# Patient Record
Sex: MALE | Race: WHITE | NOT HISPANIC OR LATINO | Employment: OTHER | URBAN - METROPOLITAN AREA
[De-identification: names, ages, dates, MRNs, and addresses within clinical notes are randomized per-mention and may not be internally consistent; named-entity substitution may affect disease eponyms.]

---

## 2017-03-13 ENCOUNTER — HISTORICAL (OUTPATIENT)
Dept: ADMINISTRATIVE | Facility: HOSPITAL | Age: 67
End: 2017-03-13

## 2019-03-18 ENCOUNTER — HISTORICAL (OUTPATIENT)
Dept: ADMINISTRATIVE | Facility: HOSPITAL | Age: 69
End: 2019-03-18

## 2019-03-18 LAB — GROUP & RH: NORMAL

## 2022-04-11 ENCOUNTER — HISTORICAL (OUTPATIENT)
Dept: ADMINISTRATIVE | Facility: HOSPITAL | Age: 72
End: 2022-04-11

## 2022-04-27 VITALS
SYSTOLIC BLOOD PRESSURE: 138 MMHG | WEIGHT: 286.19 LBS | BODY MASS INDEX: 38.76 KG/M2 | DIASTOLIC BLOOD PRESSURE: 62 MMHG | HEIGHT: 72 IN

## 2022-05-24 ENCOUNTER — LAB REQUISITION (OUTPATIENT)
Dept: LAB | Facility: HOSPITAL | Age: 72
End: 2022-05-24
Payer: MEDICARE

## 2022-05-24 DIAGNOSIS — L08.9 LOCAL INFECTION OF THE SKIN AND SUBCUTANEOUS TISSUE, UNSPECIFIED: ICD-10-CM

## 2022-05-24 PROCEDURE — 87075 CULTR BACTERIA EXCEPT BLOOD: CPT

## 2022-05-24 PROCEDURE — 87070 CULTURE OTHR SPECIMN AEROBIC: CPT

## 2022-05-28 LAB — BACTERIA SPEC CULT: NO GROWTH

## 2022-05-29 LAB
BACTERIA SPEC ANAEROBE CULT: ABNORMAL
BACTERIA SPEC ANAEROBE CULT: ABNORMAL

## 2022-06-10 ENCOUNTER — HOSPITAL ENCOUNTER (OUTPATIENT)
Facility: HOSPITAL | Age: 72
Discharge: HOME | End: 2022-06-10
Attending: PODIATRIST | Admitting: PODIATRIST
Payer: MEDICARE

## 2022-06-10 DIAGNOSIS — M86.172 ACUTE OSTEOMYELITIS OF LEFT ANKLE OR FOOT: ICD-10-CM

## 2022-06-10 PROCEDURE — C1751 CATH, INF, PER/CENT/MIDLINE: HCPCS

## 2022-06-10 PROCEDURE — 36569 INSJ PICC 5 YR+ W/O IMAGING: CPT

## 2022-06-10 RX ORDER — SODIUM CHLORIDE 0.9 % (FLUSH) 0.9 %
10 SYRINGE (ML) INJECTION EVERY 6 HOURS
Status: DISCONTINUED | OUTPATIENT
Start: 2022-06-10 | End: 2022-06-10 | Stop reason: HOSPADM

## 2022-06-10 RX ORDER — SODIUM CHLORIDE 0.9 % (FLUSH) 0.9 %
10 SYRINGE (ML) INJECTION
Status: DISCONTINUED | OUTPATIENT
Start: 2022-06-10 | End: 2022-06-10 | Stop reason: HOSPADM

## 2022-06-10 NOTE — PROCEDURES
Milan Dela Cruz is a 72 y.o. male patient.         PICC  Local anesthetic: lidocaine 1% without epinephrine    Preparation: skin prepped with ChloraPrep  Skin prep agent dried: skin prep agent completely dried prior to procedure  Sterile barriers: all five maximum sterile barriers used - cap, mask, sterile gown, sterile gloves, and large sterile sheet  Hand hygiene: hand hygiene performed prior to central venous catheter insertion  Location details: left basilic  Catheter type: double lumen  Catheter size: 5 Fr  Catheter Length: 43cm    Ultrasound guidance: yes  Needle advanced into vessel with real time Ultrasound guidance.  Guidewire confirmed in vessel.  Sterile sheath used.    Assessment: placement verified by x-ray        Arm circumference 45 cm  Sami Dorado RN  6/10/2022

## 2022-06-20 ENCOUNTER — EXTERNAL HOME HEALTH (OUTPATIENT)
Dept: HOME HEALTH SERVICES | Facility: HOSPITAL | Age: 72
End: 2022-06-20
Payer: COMMERCIAL

## 2022-08-03 ENCOUNTER — DOCUMENT SCAN (OUTPATIENT)
Dept: HOME HEALTH SERVICES | Facility: HOSPITAL | Age: 72
End: 2022-08-03
Payer: COMMERCIAL

## 2024-11-11 ENCOUNTER — LAB REQUISITION (OUTPATIENT)
Dept: LAB | Facility: HOSPITAL | Age: 74
End: 2024-11-11
Payer: COMMERCIAL

## 2024-11-11 DIAGNOSIS — L08.9 LOCAL INFECTION OF THE SKIN AND SUBCUTANEOUS TISSUE, UNSPECIFIED: ICD-10-CM

## 2024-11-11 LAB
GRAM STN SPEC: NORMAL
GRAM STN SPEC: NORMAL

## 2024-11-11 PROCEDURE — 87075 CULTR BACTERIA EXCEPT BLOOD: CPT

## 2024-11-11 PROCEDURE — 87205 SMEAR GRAM STAIN: CPT

## 2024-11-11 PROCEDURE — 87077 CULTURE AEROBIC IDENTIFY: CPT

## 2024-11-15 LAB
BACTERIA SPEC ANAEROBE CULT: ABNORMAL
BACTERIA WND CULT: ABNORMAL
BACTERIA WND CULT: ABNORMAL

## 2025-03-24 DIAGNOSIS — M25.561 BILATERAL KNEE PAIN: Primary | ICD-10-CM

## 2025-03-24 DIAGNOSIS — M25.562 BILATERAL KNEE PAIN: Primary | ICD-10-CM

## 2025-03-25 PROCEDURE — 87075 CULTR BACTERIA EXCEPT BLOOD: CPT

## 2025-03-25 PROCEDURE — 87186 SC STD MICRODIL/AGAR DIL: CPT

## 2025-03-26 ENCOUNTER — LAB REQUISITION (OUTPATIENT)
Dept: LAB | Facility: HOSPITAL | Age: 75
End: 2025-03-26
Payer: MEDICARE

## 2025-03-26 DIAGNOSIS — L08.9 LOCAL INFECTION OF THE SKIN AND SUBCUTANEOUS TISSUE, UNSPECIFIED: ICD-10-CM

## 2025-03-29 LAB — BACTERIA SPEC ANAEROBE CULT: NORMAL

## 2025-03-30 LAB
BACTERIA WND CULT: ABNORMAL

## 2025-04-17 ENCOUNTER — OFFICE VISIT (OUTPATIENT)
Dept: ORTHOPEDICS | Facility: CLINIC | Age: 75
End: 2025-04-17
Payer: MEDICARE

## 2025-04-17 DIAGNOSIS — M25.561 PAIN IN BOTH KNEES, UNSPECIFIED CHRONICITY: ICD-10-CM

## 2025-04-17 DIAGNOSIS — M25.561 BILATERAL KNEE PAIN: ICD-10-CM

## 2025-04-17 DIAGNOSIS — M25.562 PAIN IN BOTH KNEES, UNSPECIFIED CHRONICITY: ICD-10-CM

## 2025-04-17 DIAGNOSIS — M17.0 BILATERAL PRIMARY OSTEOARTHRITIS OF KNEE: Primary | ICD-10-CM

## 2025-04-17 DIAGNOSIS — M25.562 BILATERAL KNEE PAIN: ICD-10-CM

## 2025-04-17 RX ORDER — ISOSORBIDE MONONITRATE 30 MG/1
1 TABLET, EXTENDED RELEASE ORAL EVERY MORNING
COMMUNITY
Start: 2025-01-30

## 2025-04-17 RX ORDER — LIDOCAINE HYDROCHLORIDE 20 MG/ML
2 INJECTION, SOLUTION INFILTRATION; PERINEURAL
Status: DISCONTINUED | OUTPATIENT
Start: 2025-04-17 | End: 2025-04-17 | Stop reason: HOSPADM

## 2025-04-17 RX ORDER — LOVASTATIN 20 MG/1
20 TABLET ORAL
COMMUNITY
Start: 2025-02-21

## 2025-04-17 RX ORDER — METHYLPREDNISOLONE ACETATE 80 MG/ML
80 INJECTION, SUSPENSION INTRA-ARTICULAR; INTRALESIONAL; INTRAMUSCULAR; SOFT TISSUE
Status: DISCONTINUED | OUTPATIENT
Start: 2025-04-17 | End: 2025-04-17 | Stop reason: HOSPADM

## 2025-04-17 RX ORDER — ALBUTEROL SULFATE 90 UG/1
2 INHALANT RESPIRATORY (INHALATION) EVERY 6 HOURS PRN
COMMUNITY

## 2025-04-17 RX ORDER — HYDROCODONE BITARTRATE AND ACETAMINOPHEN 10; 325 MG/1; MG/1
1 TABLET ORAL 3 TIMES DAILY
COMMUNITY
Start: 2025-04-09

## 2025-04-17 RX ORDER — DIGOXIN 125 MCG
1 TABLET ORAL EVERY MORNING
COMMUNITY

## 2025-04-17 RX ORDER — GABAPENTIN 600 MG/1
900 TABLET ORAL
COMMUNITY

## 2025-04-17 RX ORDER — POTASSIUM CHLORIDE 1500 MG/1
20 TABLET, EXTENDED RELEASE ORAL 2 TIMES DAILY
COMMUNITY
Start: 2025-04-15

## 2025-04-17 RX ORDER — CARVEDILOL 3.12 MG/1
3.12 TABLET ORAL 2 TIMES DAILY
COMMUNITY
Start: 2025-03-25

## 2025-04-17 RX ORDER — SPIRONOLACTONE 25 MG/1
25 TABLET ORAL
COMMUNITY

## 2025-04-17 RX ORDER — GLIMEPIRIDE 4 MG/1
4 TABLET ORAL
COMMUNITY
Start: 2024-08-13

## 2025-04-17 RX ORDER — DILTIAZEM HYDROCHLORIDE 240 MG/1
240 CAPSULE, EXTENDED RELEASE ORAL
COMMUNITY

## 2025-04-17 RX ORDER — APIXABAN 5 MG/1
5 TABLET, FILM COATED ORAL 2 TIMES DAILY
COMMUNITY

## 2025-04-17 RX ORDER — SEMAGLUTIDE 2.68 MG/ML
2 INJECTION, SOLUTION SUBCUTANEOUS
COMMUNITY
Start: 2025-02-11

## 2025-04-17 RX ADMIN — LIDOCAINE HYDROCHLORIDE 2 MG: 20 INJECTION, SOLUTION INFILTRATION; PERINEURAL at 01:04

## 2025-04-17 RX ADMIN — METHYLPREDNISOLONE ACETATE 80 MG: 80 INJECTION, SUSPENSION INTRA-ARTICULAR; INTRALESIONAL; INTRAMUSCULAR; SOFT TISSUE at 01:04

## 2025-04-17 NOTE — PROCEDURES
Large Joint Aspiration/Injection: R knee    Date/Time: 4/17/2025 1:00 PM    Performed by: Debi Galicia PA-C  Authorized by: Debi Galicia PA-C    Consent Done?:  Yes (Verbal)  Indications:  Pain  Site marked: the procedure site was marked    Prep: patient was prepped and draped in usual sterile fashion    Approach:  Anterolateral  Location:  Knee  Site:  R knee  Medications:  2 mg LIDOcaine HCL 20 mg/ml (2%) 20 mg/mL (2 %); 80 mg methylPREDNISolone acetate 80 mg/mL  Patient tolerance:  Patient tolerated the procedure well with no immediate complications

## 2025-04-17 NOTE — PROGRESS NOTES
Subjective:    CC: Knee Pain (CHRISTIE knee pain . Patient fell 3/31/25. Patient states knees gave out  on him. Patient states the knees swell. Left knee pain stays in the knee. Right knee pain radiates up and down leg. Right knee pain is worse than left. Patient had both knees xray 3/31/25 xrays in chart.)       History of Present Illness    HPI:  Patient presents for evaluation of bilateral knee pain following a fall on 03/31/2025 due to his knees giving out. He landed on his buttocks. His right knee pain is worse, radiating up and down, while the left knee pain remains localized. Both knees swell, with the right more affected. He experiences pain across the joint line and in specific areas of both knees.    After the fall, he visited the ER on 03/31/2025, where XRs showed arthritis but no acute abnormalities. He received morphine but was unable to walk at that time. His condition has worsened since, stating that it has likely deteriorated due to increased stress on the joints. He now uses a rollator for walking and occasional rest.    He had knee injections in the right knee several years ago, which were ineffective. He is currently taking Narcotic for pain, prescribed by his primary care physician, Dr. Gaitan.    He reports lower back issues and wears a heart monitor, mentioning his cardiologist is working to strengthen his heart muscle. He has a pressure sore on his toe, present for over two years, with difficulty healing due to pressure when walking. Healing well now, denies abx or infection.        PREVIOUS TREATMENTS:  Patient received steroid injections in the right knee several years ago, which did not provide any benefit.          ROS: Refer to HPI for pertinent ROS. All other 12 point systems negative.    Past Medical History:   Diagnosis Date    Arthritis     Diabetes mellitus, type 2     Hypertension         Past Surgical History:   Procedure Laterality Date    CARDIAC SURGERY      GALLBLADDER SURGERY    "   PERIPHERALLY INSERTED CENTRAL CATHETER INSERTION N/A 06/10/2022    Procedure: INSERTION, PICC;  Surgeon: Chintan Art DPM;  Location: [facility] OR;  Service: Podiatry;  Laterality: N/A;        Medications Ordered Prior to Encounter[1]     Review of patient's allergies indicates:   Allergen Reactions    Penicillins Swelling and Other (See Comments)     "Swole up and twisted my arm"    Donepezil Other (See Comments)     Nightmares    Sulfa (sulfonamide antibiotics) Other (See Comments)       Objective:    There were no vitals filed for this visit.     Physical Exam:  The patient is well-nourished, well-developed, in no apparent distress, pleasant and cooperative. Examination of the bilateral lower extremities,  compartments are soft and warm. Skin is intact. There are no signs or symptoms of DVT or infection. There is mild right knee joint effusion. There is no erythema. Tenderness to palpation along the anterior joint line as well as patellofemoral joint of both knees, right knee range of motion is 5-95 degrees; left knee range of motion is 5-105 degrees. The knee is stable to stressing with varus and valgus. Negative anterior and posterior drawer.   Negative Lachman´s.  Negative Ayaan's test. Patella grind is positive, negative for apprehension.  Patient is neurovascularly intact distally.    Images:  Previous ER x-rays reviewed in clinic demonstrating tricompartmental osteoarthritis Images Reviewed and discussed with patient.    Assessment:  1. Pain in both knees, unspecified chronicity    2. Bilateral knee pain  - Ambulatory referral/consult to Orthopedics    3. Bilateral primary osteoarthritis of knee  - Large Joint Aspiration/Injection: R knee  - LIDOcaine HCL 20 mg/ml (2%) injection 2 mg  - methylPREDNISolone acetate injection 80 mg       Plan:  Assessment & Plan    KNEE OSTEOARTHRITIS:   Administered right knee steroid injection today.   Steroid injection provides temporary relief for pain, swelling, " and inflammation.   Discussed potential future knee replacement, pending cardiac optimization and healing of pressure sore.   Apply ice to knees for swelling.   Use knee sleeves for support if desired.   Perform home exercises for leg strengthening.   Take Tylenol as needed for pain, not exceeding 3,000 mg per day, accounting for Norco content.   Apply Voltaren gel topically for pain.    PRESSURE ULCER:   Discussed potential future knee replacement, pending cardiac optimization and healing of pressure sore.      FOLLOW-UP:   Follow up in 2-3 weeks for left knee evaluation and potential steroid injection.   Contact the office if PT is desired.          Follow up: Follow up in about 2 weeks (around 5/1/2025).    Large Joint Aspiration/Injection: R knee    Date/Time: 4/17/2025 1:00 PM    Performed by: Debi Galicia PA-C  Authorized by: Debi Galicia PA-C    Consent Done?:  Yes (Verbal)  Indications:  Pain  Site marked: the procedure site was marked    Prep: patient was prepped and draped in usual sterile fashion    Approach:  Anterolateral  Location:  Knee  Site:  R knee  Medications:  2 mg LIDOcaine HCL 20 mg/ml (2%) 20 mg/mL (2 %); 80 mg methylPREDNISolone acetate 80 mg/mL  Patient tolerance:  Patient tolerated the procedure well with no immediate complications       This note was generated with the assistance of ambient listening technology. Verbal consent was obtained by the patient and accompanying visitor(s) for the recording of patient appointment to facilitate this note. I attest to having reviewed and edited the generated note for accuracy, though some syntax or spelling errors may persist. Please contact the author of this note for any clarification.            [1]   Current Outpatient Medications on File Prior to Visit   Medication Sig Dispense Refill    carvediloL (COREG) 3.125 MG tablet Take 3.125 mg by mouth 2 (two) times daily.      glimepiride (AMARYL) 4 MG tablet Take 4 mg by mouth.       HYDROcodone-acetaminophen (NORCO)  mg per tablet Take 1 tablet by mouth 3 (three) times daily.      isosorbide mononitrate (IMDUR) 30 MG 24 hr tablet Take 1 tablet by mouth every morning.      lovastatin (MEVACOR) 20 MG tablet Take 20 mg by mouth.      OZEMPIC 2 mg/dose (8 mg/3 mL) PnIj Inject 2 mg into the skin.      potassium chloride (K-TAB) 20 mEq Take 20 mEq by mouth 2 (two) times daily.      albuterol (PROVENTIL/VENTOLIN HFA) 90 mcg/actuation inhaler Inhale 2 puffs into the lungs every 6 (six) hours as needed.      digoxin (LANOXIN) 125 mcg tablet Take 1 tablet by mouth every morning.      diltiaZEM (TIAZAC) 240 MG Cs24 Take 240 mg by mouth.      ELIQUIS 5 mg Tab Take 5 mg by mouth 2 (two) times daily.      gabapentin (NEURONTIN) 600 MG tablet Take 900 mg by mouth.      spironolactone (ALDACTONE) 25 MG tablet Take 25 mg by mouth.       No current facility-administered medications on file prior to visit.

## 2025-05-08 ENCOUNTER — OFFICE VISIT (OUTPATIENT)
Dept: ORTHOPEDICS | Facility: CLINIC | Age: 75
End: 2025-05-08
Payer: MEDICARE

## 2025-05-08 VITALS
BODY MASS INDEX: 38.76 KG/M2 | WEIGHT: 286.19 LBS | HEIGHT: 72 IN | SYSTOLIC BLOOD PRESSURE: 118 MMHG | DIASTOLIC BLOOD PRESSURE: 86 MMHG | HEART RATE: 62 BPM

## 2025-05-08 DIAGNOSIS — M17.0 BILATERAL PRIMARY OSTEOARTHRITIS OF KNEE: Primary | ICD-10-CM

## 2025-05-08 PROCEDURE — 99214 OFFICE O/P EST MOD 30 MIN: CPT | Mod: 25,,, | Performed by: ORTHOPAEDIC SURGERY

## 2025-05-08 PROCEDURE — 20610 DRAIN/INJ JOINT/BURSA W/O US: CPT | Mod: LT,,, | Performed by: ORTHOPAEDIC SURGERY

## 2025-05-08 RX ADMIN — LIDOCAINE HYDROCHLORIDE 2 MG: 20 INJECTION, SOLUTION INFILTRATION; PERINEURAL at 01:05

## 2025-05-08 RX ADMIN — METHYLPREDNISOLONE ACETATE 80 MG: 80 INJECTION, SUSPENSION INTRA-ARTICULAR; INTRALESIONAL; INTRAMUSCULAR; SOFT TISSUE at 01:05

## 2025-05-08 NOTE — PROGRESS NOTES
"Subjective:    CC: Follow-up (R knee inj 4/17/25 - L knee inj today, wbat, ambulates with walker, reports ready for left knee inj today, states has gotten some relief from rt knee inj, reports last couple days has been having some soreness in right knee, )       HPI:  Patient returns today for repeat exam.  Patient states he did get some relief with the right knee injection, he is now ready to proceed with his left knee.  He continues to have pain with long standing walking and bending, not relieved with rest medication.    ROS: Refer to HPI for pertinent ROS. All other 12 point systems negative.    Objective:  Vitals:    05/08/25 1312   BP: 118/86   Pulse: 62   Weight: 129.8 kg (286 lb 2.5 oz)   Height: 5' 11.97" (1.828 m)        Physical Exam:  The patient is well-nourished, well-developed and in no apparent distress, pleasant and cooperative. Examination of the left lower extremity compartments are soft and warm. Skin is intact. There are no signs or symptoms of DVT or infection. There is no obvious joint effusion. There is no erythema. Tender to palpation along the medial and lateral joint line , left knee range of motion is 5-100. The knee is stable to exam with varus and valgus stressing. Negative anterior and posterior drawer. Negative Lachman´s.  Negative Ayaan's test. Patella grind is positive, Negative for apprehension. Neurovascularly intact distally.    Images: . Images Reviewed and discussed with patient.    Assessment:  1. Bilateral primary osteoarthritis of knee  - Large Joint Aspiration/Injection: L knee        Plan:  At this time we have discussed his physical exam and previous imaging findings.  He tolerated the steroid injection very well through the anterolateral portal of the left knee.  He will continue low-impact activities, I would like see him back in 4 months if needed    Follow UP: No follow-ups on file.    Large Joint Aspiration/Injection: L knee    Date/Time: 5/8/2025 1:30 " PM    Performed by: Nazario Olmedo MD  Authorized by: Nazario Olmedo MD    Consent Done?:  Yes (Verbal)  Indications:  Pain  Site marked: the procedure site was marked    Prep: patient was prepped and draped in usual sterile fashion    Approach:  Anterolateral  Location:  Knee  Site:  L knee  Medications:  2 mg LIDOcaine HCL 20 mg/ml (2%) 20 mg/mL (2 %); 80 mg methylPREDNISolone acetate 80 mg/mL  Patient tolerance:  Patient tolerated the procedure well with no immediate complications

## 2025-05-08 NOTE — PROCEDURES
Large Joint Aspiration/Injection: L knee    Date/Time: 5/8/2025 1:30 PM    Performed by: Nazario Olmedo MD  Authorized by: Nazario Olmedo MD    Consent Done?:  Yes (Verbal)  Indications:  Pain  Site marked: the procedure site was marked    Prep: patient was prepped and draped in usual sterile fashion    Approach:  Anterolateral  Location:  Knee  Site:  L knee  Medications:  2 mg LIDOcaine HCL 20 mg/ml (2%) 20 mg/mL (2 %); 80 mg methylPREDNISolone acetate 80 mg/mL  Patient tolerance:  Patient tolerated the procedure well with no immediate complications

## 2025-05-10 RX ORDER — METHYLPREDNISOLONE ACETATE 80 MG/ML
80 INJECTION, SUSPENSION INTRA-ARTICULAR; INTRALESIONAL; INTRAMUSCULAR; SOFT TISSUE
Status: DISCONTINUED | OUTPATIENT
Start: 2025-05-08 | End: 2025-05-10 | Stop reason: HOSPADM

## 2025-05-10 RX ORDER — LIDOCAINE HYDROCHLORIDE 20 MG/ML
2 INJECTION, SOLUTION INFILTRATION; PERINEURAL
Status: DISCONTINUED | OUTPATIENT
Start: 2025-05-08 | End: 2025-05-10 | Stop reason: HOSPADM

## 2025-08-07 ENCOUNTER — OFFICE VISIT (OUTPATIENT)
Dept: ORTHOPEDICS | Facility: CLINIC | Age: 75
End: 2025-08-07
Payer: MEDICARE

## 2025-08-07 VITALS — HEART RATE: 77 BPM | SYSTOLIC BLOOD PRESSURE: 115 MMHG | DIASTOLIC BLOOD PRESSURE: 67 MMHG

## 2025-08-07 DIAGNOSIS — M17.0 BILATERAL PRIMARY OSTEOARTHRITIS OF KNEE: Primary | ICD-10-CM

## 2025-08-07 RX ORDER — LIDOCAINE HYDROCHLORIDE 20 MG/ML
2 INJECTION, SOLUTION INFILTRATION; PERINEURAL
Status: DISCONTINUED | OUTPATIENT
Start: 2025-08-07 | End: 2025-08-07 | Stop reason: HOSPADM

## 2025-08-07 RX ORDER — METHYLPREDNISOLONE ACETATE 80 MG/ML
80 INJECTION, SUSPENSION INTRA-ARTICULAR; INTRALESIONAL; INTRAMUSCULAR; SOFT TISSUE
Status: DISCONTINUED | OUTPATIENT
Start: 2025-08-07 | End: 2025-08-07 | Stop reason: HOSPADM

## 2025-08-07 RX ADMIN — METHYLPREDNISOLONE ACETATE 80 MG: 80 INJECTION, SUSPENSION INTRA-ARTICULAR; INTRALESIONAL; INTRAMUSCULAR; SOFT TISSUE at 01:08

## 2025-08-07 RX ADMIN — LIDOCAINE HYDROCHLORIDE 2 MG: 20 INJECTION, SOLUTION INFILTRATION; PERINEURAL at 01:08

## 2025-08-07 NOTE — PROGRESS NOTES
Subjective:    CC: Knee Pain (R knee inj 4/17/25. L knee inj 5/8/25. Injections helped for about a month, both have worn off. Rt knee pain is worse than the LT knee. Ambulating with a Rolator. Norco 10mg Q8H for chronic pain, gabapentin once a day. Knee will buckle, has had two falls last month, didn't fall on knees, had XR at [facility]. )       History of Present Illness    HPI:  Patient presents for follow-up of bilateral knee pain, with the right knee being more bothersome. He reports knee weakness and instability, stating his knees give out. He has fallen in the past due to these issues and currently uses a rolling walker for ambulation due to knee pain, weakness, and balance problems.    He has received steroid injections in both knees previously, which provided temporary relief before wearing off.  He recently visited the hospital due to falls, where XRs of his knees revealed no fractures but confirmed arthritis.    He reports tremors, which he attributes to Gabapentin use. He has a history of diabetes and is on chronic narcotic pain medication for widespread pain, including his lower back where he has calcium deposits. He expresses concern about his mobility, stating his ability to function is an issue.    He denies any open sores on his knees.    PREVIOUS TREATMENTS:  Patient has previously received steroid injections in both knees, which provided temporary relief before wearing off. He also tried gel injections, but these were not effective in managing his symptoms.          ROS: Refer to HPI for pertinent ROS. All other 12 point systems negative.    Past Medical History:   Diagnosis Date    Arthritis     Diabetes mellitus, type 2     Hypertension         Past Surgical History:   Procedure Laterality Date    CARDIAC SURGERY      CHOLECYSTECTOMY      Years ago    EYE SURGERY      Cateracts both eyes , lens implants    GALLBLADDER SURGERY      PERIPHERALLY INSERTED CENTRAL CATHETER INSERTION N/A  "06/10/2022    Procedure: INSERTION, PICC;  Surgeon: Chintan Art DPM;  Location: [facility] OR;  Service: Podiatry;  Laterality: N/A;    PROSTATE SURGERY      Localized in prostate only radiation therapy done        Medications Ordered Prior to Encounter[1]     Review of patient's allergies indicates:   Allergen Reactions    Penicillins Swelling and Other (See Comments)     "Swole up and twisted my arm"    Donepezil Other (See Comments)     Nightmares    Sulfa (sulfonamide antibiotics) Other (See Comments)       Objective:    Vitals:    08/07/25 1301   BP: 115/67   Pulse: 77        Physical Exam:  The patient is well-nourished, well-developed, in no apparent distress, pleasant and cooperative. Examination of the bilateral lower extremities,  compartments are soft and warm. Skin is intact. There are no signs or symptoms of DVT or infection. There is a mild bilateral joint effusion. There is no erythema. Tenderness to palpation along the anterior joint line, right knee range of motion is 5-95 degrees; left knee range of motion is 5-100. The knee is stable to stressing with varus and valgus. Negative anterior and posterior drawer.   Negative Lachman´s.  Negative Ayaan's test. Patella grind is positive, negative for apprehension.  Patient is neurovascularly intact distally.    Images:  Previous Images Reviewed and discussed with patient.    Assessment:  1. Bilateral primary osteoarthritis of knee  - Large Joint Aspiration/Injection: R knee  - LIDOcaine HCL 20 mg/ml (2%) injection 2 mg  - methylPREDNISolone acetate injection 80 mg       Plan:  Assessment & Plan    OSTEOARTHRITIS OF KNEE:   Corticosteroid injection administered today in the right knee.   Discussed gel injections as potential future option if steroid injections become less effective. Would need updated xr to determine if a good candidate.   Mentioned knee replacement as another option, noting need to assess patient's weight and health to determine " candidacy.   Allow 2-3 days for the steroid injection to take effect.   Be aware of potential oozing at injection site due to blood thinners.   Take Tylenol as needed for pain, not exceeding 3000mg daily when combined with Norco.    LONG TERM USE OF ANTICOAGULANTS:   Be aware of potential oozing at injection site due to blood thinners.    LONG TERM USE OF OPIATE ANALGESIC:   Take Tylenol as needed for pain, not exceeding 4000mg daily when combined with Norco.    TYPE 2 DIABETES MELLITUS:   Monitor blood sugar for potential increases following the injection.    FOLLOW-UP:   Follow up in 2 weeks for left knee injection.          Follow up: Follow up in about 2 weeks (around 8/21/2025).    Large Joint Aspiration/Injection: R knee    Date/Time: 8/7/2025 1:00 PM    Performed by: Debi Galicia PA-C  Authorized by: Debi Galicia PA-C    Site marked: the procedure site was marked    Prep: patient was prepped and draped in usual sterile fashion    Approach:  Anterolateral  Location:  Knee  Site:  R knee  Medications:  2 mg LIDOcaine HCL 20 mg/ml (2%) 20 mg/mL (2 %); 80 mg methylPREDNISolone acetate 80 mg/mL  Patient tolerance:  Patient tolerated the procedure well with no immediate complications       This note was generated with the assistance of ambient listening technology. Verbal consent was obtained by the patient and accompanying visitor(s) for the recording of patient appointment to facilitate this note. I attest to having reviewed and edited the generated note for accuracy, though some syntax or spelling errors may persist. Please contact the author of this note for any clarification.            [1]   Current Outpatient Medications on File Prior to Visit   Medication Sig Dispense Refill    albuterol (PROVENTIL/VENTOLIN HFA) 90 mcg/actuation inhaler Inhale 2 puffs into the lungs every 6 (six) hours as needed.      carvediloL (COREG) 3.125 MG tablet Take 3.125 mg by mouth 2 (two) times daily.      digoxin  (LANOXIN) 125 mcg tablet Take 1 tablet by mouth every morning.      diltiaZEM (TIAZAC) 240 MG Cs24 Take 240 mg by mouth.      ELIQUIS 5 mg Tab Take 5 mg by mouth 2 (two) times daily.      gabapentin (NEURONTIN) 600 MG tablet Take 600 mg by mouth once daily.      glimepiride (AMARYL) 4 MG tablet Take 4 mg by mouth.      HYDROcodone-acetaminophen (NORCO)  mg per tablet Take 1 tablet by mouth 3 (three) times daily.      lovastatin (MEVACOR) 20 MG tablet Take 20 mg by mouth.      OZEMPIC 2 mg/dose (8 mg/3 mL) PnIj Inject 2 mg into the skin every 7 days.      potassium chloride (K-TAB) 20 mEq Take 20 mEq by mouth 2 (two) times daily.      spironolactone (ALDACTONE) 25 MG tablet Take 25 mg by mouth.      isosorbide mononitrate (IMDUR) 30 MG 24 hr tablet Take 1 tablet by mouth every morning.       No current facility-administered medications on file prior to visit.

## 2025-08-07 NOTE — PROCEDURES
Large Joint Aspiration/Injection: R knee    Date/Time: 8/7/2025 1:00 PM    Performed by: Debi Galicia PA-C  Authorized by: Debi Galicia PA-C    Site marked: the procedure site was marked    Prep: patient was prepped and draped in usual sterile fashion    Approach:  Anterolateral  Location:  Knee  Site:  R knee  Medications:  2 mg LIDOcaine HCL 20 mg/ml (2%) 20 mg/mL (2 %); 80 mg methylPREDNISolone acetate 80 mg/mL  Patient tolerance:  Patient tolerated the procedure well with no immediate complications

## 2025-08-21 ENCOUNTER — OFFICE VISIT (OUTPATIENT)
Dept: ORTHOPEDICS | Facility: CLINIC | Age: 75
End: 2025-08-21
Payer: MEDICARE

## 2025-08-21 VITALS
DIASTOLIC BLOOD PRESSURE: 61 MMHG | BODY MASS INDEX: 40.06 KG/M2 | SYSTOLIC BLOOD PRESSURE: 92 MMHG | HEART RATE: 87 BPM | WEIGHT: 286.19 LBS | HEIGHT: 71 IN

## 2025-08-21 DIAGNOSIS — M17.0 BILATERAL PRIMARY OSTEOARTHRITIS OF KNEE: Primary | ICD-10-CM

## 2025-08-21 RX ORDER — METHYLPREDNISOLONE ACETATE 80 MG/ML
80 INJECTION, SUSPENSION INTRA-ARTICULAR; INTRALESIONAL; INTRAMUSCULAR; SOFT TISSUE
Status: DISCONTINUED | OUTPATIENT
Start: 2025-08-21 | End: 2025-08-21 | Stop reason: HOSPADM

## 2025-08-21 RX ORDER — LIDOCAINE HYDROCHLORIDE 20 MG/ML
2 INJECTION, SOLUTION INFILTRATION; PERINEURAL
Status: DISCONTINUED | OUTPATIENT
Start: 2025-08-21 | End: 2025-08-21 | Stop reason: HOSPADM

## 2025-08-21 RX ADMIN — LIDOCAINE HYDROCHLORIDE 2 MG: 20 INJECTION, SOLUTION INFILTRATION; PERINEURAL at 10:08

## 2025-08-21 RX ADMIN — METHYLPREDNISOLONE ACETATE 80 MG: 80 INJECTION, SUSPENSION INTRA-ARTICULAR; INTRALESIONAL; INTRAMUSCULAR; SOFT TISSUE at 10:08
